# Patient Record
Sex: FEMALE | Race: WHITE | NOT HISPANIC OR LATINO
[De-identification: names, ages, dates, MRNs, and addresses within clinical notes are randomized per-mention and may not be internally consistent; named-entity substitution may affect disease eponyms.]

---

## 2021-12-16 ENCOUNTER — NEW PATIENT (OUTPATIENT)
Dept: URBAN - METROPOLITAN AREA CLINIC 48 | Facility: CLINIC | Age: 86
End: 2021-12-16

## 2021-12-16 DIAGNOSIS — H43.813: ICD-10-CM

## 2021-12-16 DIAGNOSIS — H25.13: ICD-10-CM

## 2021-12-16 DIAGNOSIS — H35.363: ICD-10-CM

## 2021-12-16 DIAGNOSIS — H52.4: ICD-10-CM

## 2021-12-16 PROCEDURE — 92015 DETERMINE REFRACTIVE STATE: CPT

## 2021-12-16 PROCEDURE — 92134 CPTRZ OPH DX IMG PST SGM RTA: CPT

## 2021-12-16 PROCEDURE — 92004 COMPRE OPH EXAM NEW PT 1/>: CPT

## 2021-12-16 ASSESSMENT — TONOMETRY
OD_IOP_MMHG: 14
OS_IOP_MMHG: 15

## 2021-12-16 ASSESSMENT — VISUAL ACUITY
OU_SC: 20/100
OU_CC: 20/70
OD_PH: 20/70
OS_CC: 20/70
OD_GLARE: 20/400
OD_SC: 20/150
OD_CC: 20/80
OS_GLARE: 20/200
OS_SC: 20/100
OS_PH: 20/30

## 2021-12-16 ASSESSMENT — KERATOMETRY
OS_K2POWER_DIOPTERS: 45.00
OD_AXISANGLE_DEGREES: 095
OD_K2POWER_DIOPTERS: 45.25
OD_AXISANGLE2_DEGREES: 5
OS_AXISANGLE_DEGREES: 095
OS_K1POWER_DIOPTERS: 44.00
OS_AXISANGLE2_DEGREES: 5
OD_K1POWER_DIOPTERS: 43.50

## 2022-01-07 ENCOUNTER — DIAGNOSTICS ONLY (OUTPATIENT)
Dept: URBAN - METROPOLITAN AREA CLINIC 49 | Facility: CLINIC | Age: 87
End: 2022-01-07

## 2022-01-07 DIAGNOSIS — H25.13: ICD-10-CM

## 2022-01-07 PROCEDURE — 92136 OPHTHALMIC BIOMETRY: CPT

## 2022-01-07 PROCEDURE — 92025IOL CORNEAL TOPOGRAPHY PREMIUM IOL

## 2022-01-07 ASSESSMENT — KERATOMETRY
OD_AXISANGLE2_DEGREES: 103
OS_K2POWER_DIOPTERS: 44.00
OS_AXISANGLE_DEGREES: 9
OS_K1POWER_DIOPTERS: 44.87
OD_AXISANGLE_DEGREES: 013
OD_K1POWER_DIOPTERS: 45.25
OS_AXISANGLE2_DEGREES: 099
OD_K2POWER_DIOPTERS: 43.87

## 2022-02-04 ENCOUNTER — PRE-OP/H&P (OUTPATIENT)
Dept: URBAN - METROPOLITAN AREA CLINIC 49 | Facility: CLINIC | Age: 87
End: 2022-02-04

## 2022-02-04 VITALS — SYSTOLIC BLOOD PRESSURE: 123 MMHG | HEART RATE: 62 BPM | DIASTOLIC BLOOD PRESSURE: 77 MMHG | HEIGHT: 55 IN

## 2022-02-04 DIAGNOSIS — H25.11: ICD-10-CM

## 2022-02-04 DIAGNOSIS — H35.363: ICD-10-CM

## 2022-02-04 PROCEDURE — PREOP PRE OP VISIT

## 2022-02-04 PROCEDURE — 92134 CPTRZ OPH DX IMG PST SGM RTA: CPT

## 2022-02-04 ASSESSMENT — VISUAL ACUITY
OS_PH: 20/70
OS_GLARE: 20/200
OD_SC: 20/150
OD_GLARE: 20/400
OS_SC: 20/100
OD_PH: 20/70

## 2022-02-04 ASSESSMENT — TONOMETRY
OS_IOP_MMHG: 14
OD_IOP_MMHG: 15

## 2022-02-04 NOTE — PATIENT DISCUSSION
CATARACT SURGERY PLANNER - TORIC IOL/+FEMTO: Phacoemulsification with IOL: Eye: right|DOS: 2/9/22|Model: Duy Sheffield: 21|Target: dist|Femto: yes|Axis: 010°|Visc: duet|Omidria: yes|10% Phenylephrine: no|Epi-shugarcaine: yes|Phaco Setting: dense|BSS+: no|Trypan Blue: no|CTR: no|Olive Tip: no|Atropine: no|Pupilloplasty: no|Notes: no.

## 2022-02-09 ENCOUNTER — SURGERY/PROCEDURE (OUTPATIENT)
Dept: URBAN - METROPOLITAN AREA SURGERY 16 | Facility: SURGERY | Age: 87
End: 2022-02-09

## 2022-02-09 ENCOUNTER — POST-OP (OUTPATIENT)
Dept: URBAN - METROPOLITAN AREA CLINIC 48 | Facility: CLINIC | Age: 87
End: 2022-02-09

## 2022-02-09 DIAGNOSIS — H25.11: ICD-10-CM

## 2022-02-09 DIAGNOSIS — Z98.41: ICD-10-CM

## 2022-02-09 DIAGNOSIS — Z96.1: ICD-10-CM

## 2022-02-09 PROCEDURE — 99024 POSTOP FOLLOW-UP VISIT: CPT

## 2022-02-09 PROCEDURE — 66984 XCAPSL CTRC RMVL W/O ECP: CPT

## 2022-02-09 ASSESSMENT — VISUAL ACUITY: OD_SC: 20/200

## 2022-02-09 ASSESSMENT — TONOMETRY: OD_IOP_MMHG: 10

## 2022-02-09 NOTE — PROCEDURE NOTE: SURGICAL
"<span style=""color: [id number]; font-family: marcy [facility] Never

## 2022-02-18 ENCOUNTER — POST OP/EVAL OF SECOND EYE (OUTPATIENT)
Dept: URBAN - METROPOLITAN AREA CLINIC 49 | Facility: CLINIC | Age: 87
End: 2022-02-18

## 2022-02-18 DIAGNOSIS — H25.12: ICD-10-CM

## 2022-02-18 DIAGNOSIS — H35.363: ICD-10-CM

## 2022-02-18 PROCEDURE — 92136 - 2N OPHTHALMIC BIOMETRY BY PARTIAL COHERENCE INTERFEROMETRY WITH INTRAOCULAR LENS POWER CALCULATION

## 2022-02-18 PROCEDURE — 92134 CPTRZ OPH DX IMG PST SGM RTA: CPT

## 2022-02-18 PROCEDURE — PREOP PRE OP VISIT

## 2022-02-18 ASSESSMENT — VISUAL ACUITY
OD_SC: J3-
OD_SC: 20/30-2
OD_PH: 20/25-2
OD_SC: J5
OS_SC: 20/70

## 2022-02-18 ASSESSMENT — TONOMETRY
OS_IOP_MMHG: 15
OD_IOP_MMHG: 16

## 2022-02-18 NOTE — PATIENT DISCUSSION
CATARACT SURGERY PLANNER - TORIC IOL/+FEMTO: Phacoemulsification with IOL: Eye: OS|DOS: 2/23/22|Model: [id number]|Power: 21. 5|Target: PLANO|Femto: YES|Arcs: / @ / ; / @ [place]: 006|Visc: DUET|Omidria: YES|10% Phenylephrine: NO|Epi-shugarcaine: YES|Phaco Setting: DENSE|BSS+: Maya Hathaway: NO|CTR: NO|Olive Tip: NO|Atropine: NO|Pupilloplasty: NO|Notes: NO.

## 2022-02-23 ENCOUNTER — POST-OP (OUTPATIENT)
Dept: URBAN - METROPOLITAN AREA CLINIC 48 | Facility: CLINIC | Age: 87
End: 2022-02-23

## 2022-02-23 ENCOUNTER — SURGERY/PROCEDURE (OUTPATIENT)
Dept: URBAN - METROPOLITAN AREA SURGERY 16 | Facility: SURGERY | Age: 87
End: 2022-02-23

## 2022-02-23 DIAGNOSIS — Z98.41: ICD-10-CM

## 2022-02-23 DIAGNOSIS — Z96.1: ICD-10-CM

## 2022-02-23 DIAGNOSIS — H25.12: ICD-10-CM

## 2022-02-23 DIAGNOSIS — Z98.42: ICD-10-CM

## 2022-02-23 PROCEDURE — 66984 XCAPSL CTRC RMVL W/O ECP: CPT

## 2022-02-23 PROCEDURE — 99024 POSTOP FOLLOW-UP VISIT: CPT

## 2022-02-23 ASSESSMENT — VISUAL ACUITY: OS_SC: 20/80

## 2022-02-23 ASSESSMENT — TONOMETRY: OS_IOP_MMHG: 12

## 2022-03-04 ENCOUNTER — POST-OP (OUTPATIENT)
Dept: URBAN - METROPOLITAN AREA CLINIC 49 | Facility: CLINIC | Age: 87
End: 2022-03-04

## 2022-03-04 DIAGNOSIS — Z96.1: ICD-10-CM

## 2022-03-04 PROCEDURE — 99024 POSTOP FOLLOW-UP VISIT: CPT

## 2022-03-04 ASSESSMENT — TONOMETRY
OS_IOP_MMHG: 15
OD_IOP_MMHG: 14

## 2022-03-04 ASSESSMENT — VISUAL ACUITY
OD_SC: 20/25-2
OS_SC: 20/20-1

## 2022-04-01 ENCOUNTER — POST-OP (OUTPATIENT)
Dept: URBAN - METROPOLITAN AREA CLINIC 49 | Facility: CLINIC | Age: 87
End: 2022-04-01

## 2022-04-01 DIAGNOSIS — Z98.41: ICD-10-CM

## 2022-04-01 DIAGNOSIS — Z98.42: ICD-10-CM

## 2022-04-01 DIAGNOSIS — Z96.1: ICD-10-CM

## 2022-04-01 DIAGNOSIS — H52.4: ICD-10-CM

## 2022-04-01 PROCEDURE — 99024 POSTOP FOLLOW-UP VISIT: CPT

## 2022-04-01 PROCEDURE — 92015 DETERMINE REFRACTIVE STATE: CPT

## 2022-04-01 ASSESSMENT — VISUAL ACUITY
OU_SC: J2
OS_GLARE: 20/25-2
OS_SC: 20/25-1
OD_SC: 20/30
OU_SC: J3
OD_GLARE: 20/25-1
OD_PH: 20/25

## 2022-04-01 ASSESSMENT — TONOMETRY
OD_IOP_MMHG: 14
OS_IOP_MMHG: 15

## 2022-06-21 NOTE — PATIENT DISCUSSION
Recommend PF artificial tears 4 times daily in both eyes for best vision and comfort. Brands such as Refresh, Thera Tears, and Systane are good options. Continue warm compresses BID OU. Start lid scrubs BID OU. Start gel drops QHS OU.

## 2022-06-21 NOTE — PATIENT DISCUSSION
Discussed different options of dry eye regiment if symptoms do not improve. Discussed Tryvuya, Doxycycline, and Lipiflow.

## 2022-06-21 NOTE — PATIENT DISCUSSION
Patient has used Klarity C and Restasis in the past but did not notice relief. Patient states that she quit taking Restasis because it burned. Patient states that Lotemax helped but explained to patient that is not a long term drop. Explained to patient that [facility] is now covered by some Medicare plans. Will start Lotemax BID for 2 weeks then QDAY for 2 weeks. Start Xiidra BID OU in 2 weeks. Advised patient to wait 10 minutes between drops.

## 2022-06-21 NOTE — PATIENT DISCUSSION
At next visit we will do Ivett Alaniz. Discussed the prices and how Lipiflow works. If dryness does not improve with Lotemax and Adrienne Barrio then we will consider doing Lipiflow.

## 2022-08-02 NOTE — PATIENT DISCUSSION
Discussed different options of dry eye regiment if symptoms do not improve. Discussed Tryvuya,  and Lipiflow.

## 2022-08-02 NOTE — PATIENT DISCUSSION
Tabby Blount done today and discussed with patient. Explained to patient that we can do Lipiflow but it wont help the glands that are gone but that the ones that are still working to work better. Discussed Lipiflow is not a cure for dry eye and may take a few weeks to notice improvement.

## 2022-08-02 NOTE — PATIENT DISCUSSION
Recommend PF artificial tears 4 times daily in both eyes for best vision and comfort. Brands such as Refresh, Thera Tears, and Systane are good options. Continue warm compresses BID OU. Continue lid scrubs BID OU. Continue gel/ointment drops QHS OU. Restart Restasis BID OU.

## 2022-08-02 NOTE — PATIENT DISCUSSION
Patient has used Restasis in the past (a few years ago). Patient states that she quit taking Restasis because it burned. Patient states that Lotemax helped but explained to patient that is not a long term drop. Patient does not like [place] due to cost and burning.

## 2022-11-29 NOTE — PATIENT DISCUSSION
Dex Mcmillan done today and discussed with patient. Explained to patient that we can do Lipiflow but it wont help the glands that are gone but that the ones that are still working to work better. Discussed Lipiflow is not a cure for dry eye and may take a few weeks to notice improvement.

## 2022-11-29 NOTE — PATIENT DISCUSSION
Patient has used Restasis in the past (a few years ago). Patient states that she quit taking Restasis because it burned. Patient states that Lotemax helped but explained to patient that is not a long term drop. Patient does not like [employer] due to cost and burning.

## 2023-01-10 NOTE — PATIENT DISCUSSION
Vicente Bright done today and discussed with patient. Explained to patient that we can do Lipiflow but it wont help the glands that are gone but that the ones that are still working to work better. Discussed Lipiflow is not a cure for dry eye and may take a few weeks to notice improvement.

## 2023-01-10 NOTE — PATIENT DISCUSSION
Patient has used Restasis in the past (a few years ago). Patient states that she quit taking Restasis because it burned. Patient states that Lotemax helped but explained to patient that is not a long term drop. Patient does not like [facility] due to cost and burning.

## 2023-04-03 ENCOUNTER — COMPREHENSIVE EXAM (OUTPATIENT)
Dept: URBAN - METROPOLITAN AREA CLINIC 48 | Facility: LOCATION | Age: 88
End: 2023-04-03

## 2023-04-03 DIAGNOSIS — H04.123: ICD-10-CM

## 2023-04-03 DIAGNOSIS — H26.493: ICD-10-CM

## 2023-04-03 DIAGNOSIS — H43.813: ICD-10-CM

## 2023-04-03 DIAGNOSIS — H35.363: ICD-10-CM

## 2023-04-03 PROCEDURE — 92014 COMPRE OPH EXAM EST PT 1/>: CPT

## 2023-04-03 PROCEDURE — 92134 CPTRZ OPH DX IMG PST SGM RTA: CPT

## 2023-04-03 ASSESSMENT — VISUAL ACUITY
OD_GLARE: 20/40
OD_SC: 20/25-2
OS_SC: 20/20-1
OU_CC: J1(-2) @16IN
OS_GLARE: 20/25-1
OD_GLARE: 20/30

## 2023-04-03 ASSESSMENT — TONOMETRY
OD_IOP_MMHG: 14
OS_IOP_MMHG: 13

## 2023-08-03 NOTE — PATIENT DISCUSSION
"""Informed patient that their cataract(s) are not visually significant or do not meet the criteria for "" Z Plasty Text: The lesion was extirpated to the level of the fat with a #15 scalpel blade.  Given the location of the defect, shape of the defect and the proximity to free margins a Z-plasty was deemed most appropriate for repair.  Using a sterile surgical marker, the appropriate transposition arms of the Z-plasty were drawn incorporating the defect and placing the expected incisions within the relaxed skin tension lines where possible.    The area thus outlined was incised deep to adipose tissue with a #15 scalpel blade.  The skin margins were undermined to an appropriate distance in all directions utilizing iris scissors.  The opposing transposition arms were then transposed into place in opposite direction and anchored with interrupted buried subcutaneous sutures.  The skin margins were undermined to an appropriate distance in all directions utilizing iris scissors and carried over to close the primary defect.

## 2024-04-17 ENCOUNTER — COMPREHENSIVE EXAM (OUTPATIENT)
Dept: URBAN - METROPOLITAN AREA CLINIC 48 | Facility: LOCATION | Age: 89
End: 2024-04-17

## 2024-04-17 DIAGNOSIS — H35.363: ICD-10-CM

## 2024-04-17 DIAGNOSIS — H43.813: ICD-10-CM

## 2024-04-17 DIAGNOSIS — H26.493: ICD-10-CM

## 2024-04-17 DIAGNOSIS — H04.123: ICD-10-CM

## 2024-04-17 DIAGNOSIS — H01.006: ICD-10-CM

## 2024-04-17 DIAGNOSIS — H01.003: ICD-10-CM

## 2024-04-17 PROCEDURE — 99214 OFFICE O/P EST MOD 30 MIN: CPT

## 2024-04-17 PROCEDURE — 92134 CPTRZ OPH DX IMG PST SGM RTA: CPT

## 2024-04-17 ASSESSMENT — KERATOMETRY
OD_AXISANGLE_DEGREES: 010
OD_AXISANGLE2_DEGREES: 100
OD_K2POWER_DIOPTERS: 43.25
OD_K1POWER_DIOPTERS: 45.25
OS_AXISANGLE2_DEGREES: 85
OS_K2POWER_DIOPTERS: 44.00
OS_AXISANGLE_DEGREES: 175
OS_K1POWER_DIOPTERS: 45.25

## 2024-04-17 ASSESSMENT — TONOMETRY
OD_IOP_MMHG: 12
OS_IOP_MMHG: 11

## 2024-04-17 ASSESSMENT — VISUAL ACUITY
OS_GLARE: 20/25
OU_SC: 20/25
OS_SC: 20/25-1
OD_GLARE: 20/30
OD_PH: 20/25
OD_GLARE: 20/30
OU_CC: J1+@16"
OD_SC: 20/30
OS_GLARE: 20/25
OU_SC: J3@16"

## 2025-07-09 ENCOUNTER — COMPREHENSIVE EXAM (OUTPATIENT)
Age: OVER 89
End: 2025-07-09

## 2025-07-09 DIAGNOSIS — H52.4: ICD-10-CM

## 2025-07-09 DIAGNOSIS — H04.123: ICD-10-CM

## 2025-07-09 DIAGNOSIS — H35.363: ICD-10-CM

## 2025-07-09 DIAGNOSIS — H43.813: ICD-10-CM

## 2025-07-09 DIAGNOSIS — H35.372: ICD-10-CM

## 2025-07-09 DIAGNOSIS — H26.493: ICD-10-CM

## 2025-07-09 PROCEDURE — 99214 OFFICE O/P EST MOD 30 MIN: CPT

## 2025-07-09 PROCEDURE — 92134 CPTRZ OPH DX IMG PST SGM RTA: CPT

## 2025-07-09 PROCEDURE — 92015 DETERMINE REFRACTIVE STATE: CPT
